# Patient Record
Sex: FEMALE | Race: WHITE
[De-identification: names, ages, dates, MRNs, and addresses within clinical notes are randomized per-mention and may not be internally consistent; named-entity substitution may affect disease eponyms.]

---

## 2021-03-18 ENCOUNTER — HOSPITAL ENCOUNTER (EMERGENCY)
Dept: HOSPITAL 72 - EMR | Age: 31
Discharge: HOME | End: 2021-03-18
Payer: COMMERCIAL

## 2021-03-18 VITALS — SYSTOLIC BLOOD PRESSURE: 120 MMHG | DIASTOLIC BLOOD PRESSURE: 84 MMHG

## 2021-03-18 VITALS — HEIGHT: 62 IN | WEIGHT: 128 LBS | BODY MASS INDEX: 23.55 KG/M2
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
## 2021-03-18 DIAGNOSIS — S09.90XA: Primary | ICD-10-CM
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
## 2021-03-18 DIAGNOSIS — S06.0X9A: ICD-10-CM

## 2021-03-18 PROCEDURE — 70450 CT HEAD/BRAIN W/O DYE: CPT

## 2021-03-18 PROCEDURE — 99284 EMERGENCY DEPT VISIT MOD MDM: CPT

## 2021-03-18 NOTE — EMERGENCY ROOM REPORT
History of Present Illness


General


Chief Complaint:  Head Injury


Source:  Patient





Present Illness


HPI


Patient is a 30-year-old female denies any significant past medical history who 

presents to the ER status post closed head trauma.  Patient states that last 

night around 9 PM she bent down to pick sitting up and lifted her head hitting 

the right part of the back of her head on the corner.  She denies any loss of 

consciousness but states that she felt lightheaded and nauseous.  She continues 

to feel lightheaded when exerting herself today and feels nauseous.  She denies 

any focal weakness or slurred speech.  She denies being on any blood thinners.  

She states that she is not currently dizzy but felt dizzy when she was walking 

and when she was working on the computer.  No history of head trauma in the 

past.


Allergies:  


Coded Allergies:  


     No Known Allergies (Unverified , 3/18/21)





COVID-19 Screening


Contact w/high risk pt:  No


Experienced COVID-19 symptoms?:  No


COVID-19 Testing performed PTA:  No


COVID-19 Screening:  Negative COVID-19





Patient History


Reviewed Nursing Documentation:  PMH: Agreed; PSxH: Agreed





Nursing Documentation-PMH


Hx Cardiac Problems:  No


Hx Hypertension:  No


Hx Pacemaker:  No


Hx Asthma:  No


Hx COPD:  No


Hx Diabetes:  No


Hx Cancer:  No


Hx Gastrointestinal Problems:  No


Hx Dialysis:  No


History Of Psychiatric Problem:  No


Hx Neurological Problems:  No


Hx Cerebrovascular Accident:  No


Hx Seizures:  No





Review of Systems


All Other Systems:  negative except mentioned in HPI





Physical Exam





Vital Signs








  Date Time  Temp Pulse Resp B/P (MAP) Pulse Ox O2 Delivery O2 Flow Rate FiO2


 


3/18/21 16:06 98.8 84 18 123/72 (89) 98 Room Air  








Sp02 EP Interpretation:  reviewed, normal


General Appearance:  no apparent distress, alert, GCS 15, non-toxic


Head:  normocephalic, other - Right occipital tenderness to palpation


Eyes:  bilateral eye PERRL - Uneven pupils left bigger than right both reactive 

to light, bilateral eye EOMI


ENT:  normal ENT inspection, EOM grossly intact


Neck:  full range of motion, no meningismus, no bony tend


Respiratory:  lungs clear, normal breath sounds


Cardiovascular #1:  regular rate, rhythm


Gastrointestinal:  non tender, soft


Rectal:  deferred


Musculoskeletal:  normal range of motion, moves extm spontaneously


Neurologic:  CNs III-XII nml as tested, oriented x3


Psychiatric:  no suicidal/homicidal ideation


Skin:  no rash


Lymphatic:  no adenopathy





Medical Decision Making


Diagnostic Impression:  


   Primary Impression:  


   Acute head injury


   Additional Impression:  


   Concussion


ER Course


Patient showed me an old picture of herself and it appears that her left pupil 

is slightly larger than her right pupil which is consistent with her exam today.

 She has no focal neurologic deficits.  CT brain demonstrates no intracranial 

hemorrhage.  I have given the patient a copy of her CT results.  I explained to 

her that I am concerned about a concussion but is not a diagnosis that we can 

definitively make in the emergency department.  Have asked her to rest and 

refrain from using the computer as she is a .  I have asked her

to call her primary care physician in the morning to arrange for neurology 

follow-up.  I have also given her a work note to release her from work.  After 

discussing risks and benefits of further diagnostics, treatment plans, as well 

as indications for and risks of admission, the patient is agreeable to being 

discharged home.  I have explained that their evaluation and treatment in the 

emergency department today is an important step towards them achieving better 

health but that their evaluation today is not intended to replace further 

evaluation and treatment by a physician in their local clinic.  I have explained

that while the current findings suggest no immediate life threatening emergency 

they will require further evaluation and treatment by a physician of their 

choice in their area.  They understand that it will be necessary for them to 

review the final reports of their ED visit with their clinic physician.  We have

reviewed indications for return to the Emergency Department.  I have explained 

that additional time may need to pass and/or additional testing as an outpatient

may be necessary before a definitive diagnosis can be made.  They tell me they 

are willing to follow up as instructed within the timeframe I recommend.  They 

appear to understand what we discussed.  Additionally they understand that if 

they are unable to be seen by an outpatient physician they are welcome, and in 

fact should, return to the Emergency Department for a repeat evaluation.  The 

patient is stable at time of discharge.





Last Vital Signs








  Date Time  Temp Pulse Resp B/P (MAP) Pulse Ox O2 Delivery O2 Flow Rate FiO2


 


3/18/21 16:06 98.8 84 18 123/72 (89) 98 Room Air  








Disposition:  HOME, SELF-CARE


Condition:  Stable


Scripts


Ondansetron* (ZOFRAN*) 4 Mg Tablet


4 MG ORAL Q6H PRN for Nausea & Vomiting, #14 TAB


   Prov: Yomtoubian,Martha M.D.         3/18/21





Additional Instructions:  


The patient was provided with discharge instructions, notified to follow-up with

a primary care doctor and or specialist in the next 24-48 hours, and to return 

to the ED if they have worsening of their symptoms. 





Please note that this report is being documented using DRAGON technology.


This can lead to erroneous entry secondary to incorrect interpretation by the 

dictating instrument.











Martha Rome M.D.        Mar 18, 2021 16:23

## 2021-03-18 NOTE — DIAGNOSTIC IMAGING REPORT
Indications: Syncope

 

Technique: Spiral acquisitions obtained through the brain. Angled axial and coronal 5

x 5 mm slices were reconstructed. Total dose length product 965 mGycm.  CTDI vol(s)

53 mGy. Dose reduction achieved using automated exposure control

 

Comparison: None.

 

Findings: No acute intracranial hemorrhage or edema, mass effect, or midline shift.

Normal gray-white differentiation. Normal size ventricles and extra axial CSF spaces.

The mastoids are clear. The calvarium is intact.

 

Impression: Negative

 

 

 

 

 

The CT scanner at [facility] is accredited by the [employer]

[employer]iology and the scans are performed using protocols designed to limit radiation

exposure to as low as reasonably achievable to attain images of sufficient resolution

adequate for diagnostic evaluation.

## 2021-03-18 NOTE — NUR
pt arrives to ER with complaints of headache after head injury. pt states 
hitting head on sharp shelf yesterday. pt states waking up with headache and 
sensitivity to light. pt is AAOX4.

## 2021-03-18 NOTE — NUR
Pt reports posterior head injury after standing up and hitting her posterior 
head against the corner of a table top. Pt did not have LOC, but stated she 
immediately felt nausea. Pt reports dizziness and nausea since last night that 
worsens with walking and looking at computer screen. Pt states she has dull 
pain at this moment. ED MD verified patient has unequal pupils. Pt L pupil is 
noticeably larger than her R pupil. Pt is ambulatory with steady gait, AxO x4, 
no SS of respiratory distress noted at this time.